# Patient Record
Sex: FEMALE | Race: WHITE | NOT HISPANIC OR LATINO | URBAN - NONMETROPOLITAN AREA
[De-identification: names, ages, dates, MRNs, and addresses within clinical notes are randomized per-mention and may not be internally consistent; named-entity substitution may affect disease eponyms.]

---

## 2021-04-15 ENCOUNTER — OFFICE VISIT (OUTPATIENT)
Dept: URBAN - NONMETROPOLITAN AREA CLINIC 2 | Facility: CLINIC | Age: 81
End: 2021-04-15
Payer: MEDICARE

## 2021-04-15 ENCOUNTER — LAB OUTSIDE AN ENCOUNTER (OUTPATIENT)
Dept: URBAN - NONMETROPOLITAN AREA CLINIC 2 | Facility: CLINIC | Age: 81
End: 2021-04-15

## 2021-04-15 ENCOUNTER — WEB ENCOUNTER (OUTPATIENT)
Dept: URBAN - NONMETROPOLITAN AREA CLINIC 2 | Facility: CLINIC | Age: 81
End: 2021-04-15

## 2021-04-15 VITALS
TEMPERATURE: 97.2 F | WEIGHT: 130 LBS | SYSTOLIC BLOOD PRESSURE: 108 MMHG | HEART RATE: 70 BPM | HEIGHT: 65 IN | DIASTOLIC BLOOD PRESSURE: 70 MMHG | BODY MASS INDEX: 21.66 KG/M2

## 2021-04-15 DIAGNOSIS — D18.03 LIVER HEMANGIOMA: ICD-10-CM

## 2021-04-15 DIAGNOSIS — Z12.11 ROUTINE COLON: ICD-10-CM

## 2021-04-15 PROCEDURE — 99214 OFFICE O/P EST MOD 30 MIN: CPT | Performed by: NURSE PRACTITIONER

## 2021-04-15 NOTE — HPI-TODAY'S VISIT:
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  Her last visit was in 2019 where she had multiple liver hemangiomas 1 almost 4 cm noted on ultrasound imaging.  She is on hormone replacement therapy persistently above the age of 79 and doing well on this, she is not interested in weaning off unless necessary.  Today she is here for repeat ultrasound imaging of her liver lesions.  Her last colonoscopy was in 2017 and normal.  Likely repeat as needed given age.  Today she has no new GI complaints.  MB

## 2022-04-12 ENCOUNTER — OFFICE VISIT (OUTPATIENT)
Dept: URBAN - NONMETROPOLITAN AREA CLINIC 2 | Facility: CLINIC | Age: 82
End: 2022-04-12
Payer: MEDICARE

## 2022-04-12 ENCOUNTER — LAB OUTSIDE AN ENCOUNTER (OUTPATIENT)
Dept: URBAN - NONMETROPOLITAN AREA CLINIC 2 | Facility: CLINIC | Age: 82
End: 2022-04-12

## 2022-04-12 DIAGNOSIS — Z12.11 ROUTINE COLON: ICD-10-CM

## 2022-04-12 DIAGNOSIS — D18.03 LIVER HEMANGIOMA: ICD-10-CM

## 2022-04-12 PROCEDURE — 99214 OFFICE O/P EST MOD 30 MIN: CPT | Performed by: INTERNAL MEDICINE

## 2022-04-12 NOTE — HPI-TODAY'S VISIT:
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  Her last visit was in 2019 where she had multiple liver hemangiomas 1 almost 4 cm noted on ultrasound imaging.  She is on hormone replacement therapy persistently above the age of 79 and doing well on this, she is not interested in weaning off unless necessary.  Today she is here for repeat ultrasound imaging of her liver lesions.  Her last colonoscopy was in 2017 and normal.  Likely repeat as needed given age.  Today she has no new GI complaints.  MB 4/12/2022 The patient presents today for follow-up of her liver cysts and hemangiomas.  Since her last visit, she has been doing quite well.  She denies any problems with her bowels.  She denies any blood in her stool.  She has not had a repeat ultrasound since her last visit.  She has had her blood work done with her PCP, and this was done last month.  Her last ultrasound was done in April 2021 with several small hemangiomas.  She does remain on hormone replacement therapy.  At this point, we have discussed repeating her ultrasound today, and she is in agreement with this plan.  Otherwise, she has no other GI symptoms today.

## 2022-04-20 ENCOUNTER — TELEPHONE ENCOUNTER (OUTPATIENT)
Dept: URBAN - METROPOLITAN AREA CLINIC 92 | Facility: CLINIC | Age: 82
End: 2022-04-20

## 2022-10-18 ENCOUNTER — OFFICE VISIT (OUTPATIENT)
Dept: URBAN - NONMETROPOLITAN AREA CLINIC 2 | Facility: CLINIC | Age: 82
End: 2022-10-18

## 2023-04-11 ENCOUNTER — OFFICE VISIT (OUTPATIENT)
Dept: URBAN - NONMETROPOLITAN AREA CLINIC 2 | Facility: CLINIC | Age: 83
End: 2023-04-11

## 2023-04-14 ENCOUNTER — OFFICE VISIT (OUTPATIENT)
Dept: URBAN - NONMETROPOLITAN AREA CLINIC 2 | Facility: CLINIC | Age: 83
End: 2023-04-14

## 2023-07-10 ENCOUNTER — OFFICE VISIT (OUTPATIENT)
Dept: URBAN - NONMETROPOLITAN AREA CLINIC 2 | Facility: CLINIC | Age: 83
End: 2023-07-10
Payer: MEDICARE

## 2023-07-10 ENCOUNTER — DASHBOARD ENCOUNTERS (OUTPATIENT)
Age: 83
End: 2023-07-10

## 2023-07-10 VITALS
BODY MASS INDEX: 22.66 KG/M2 | WEIGHT: 136 LBS | HEART RATE: 71 BPM | DIASTOLIC BLOOD PRESSURE: 66 MMHG | HEIGHT: 65 IN | TEMPERATURE: 97.8 F | SYSTOLIC BLOOD PRESSURE: 114 MMHG

## 2023-07-10 DIAGNOSIS — D18.03 LIVER HEMANGIOMA: ICD-10-CM

## 2023-07-10 PROBLEM — 93469006: Status: ACTIVE | Noted: 2021-04-15

## 2023-07-10 PROCEDURE — 99214 OFFICE O/P EST MOD 30 MIN: CPT | Performed by: NURSE PRACTITIONER

## 2023-07-10 RX ORDER — ASPIRIN 81 MG/1
EVERY 2 DAYS TABLET, COATED ORAL
Refills: 0 | Status: ACTIVE | COMMUNITY
Start: 2015-10-02

## 2023-07-10 NOTE — HPI-TODAY'S VISIT:
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  Her last visit was in 2019 where she had multiple liver hemangiomas 1 almost 4 cm noted on ultrasound imaging.  She is on hormone replacement therapy persistently above the age of 79 and doing well on this, she is not interested in weaning off unless necessary.  Today she is here for repeat ultrasound imaging of her liver lesions.  Her last colonoscopy was in 2017 and normal.  Likely repeat as needed given age.  Today she has no new GI complaints.  MB 4/12/2022 The patient presents today for follow-up of her liver cysts and hemangiomas.  Since her last visit, she has been doing quite well.  She denies any problems with her bowels.  She denies any blood in her stool.  She has not had a repeat ultrasound since her last visit.  She has had her blood work done with her PCP, and this was done last month.  Her last ultrasound was done in April 2021 with several small hemangiomas.  She does remain on hormone replacement therapy.  At this point, we have discussed repeating her ultrasound today, and she is in agreement with this plan.  Otherwise, she has no other GI symptoms today. 7/10/2023 Gurinder presents for follow-up of liver lesion.  Since her last visit she is been doing well.  She does remain on hormone replacement therapy.  Her last ultrasound in July 2022 was stable.  The cyst actually decreased to 2.5 cm.  Today she denies any new GI complaints, we will repeat her ultrasound in 1 year.  MB

## 2023-07-10 NOTE — PHYSICAL EXAM NEUROLOGIC:
Ecchymoses and pupura on UE bilaterally  · Advised mindfulness of movements  · Monitor  · continue APT   oriented to person, place and time ,  , cranial nerves 2-12 grossly intact

## 2024-07-08 ENCOUNTER — OFFICE VISIT (OUTPATIENT)
Dept: URBAN - NONMETROPOLITAN AREA CLINIC 2 | Facility: CLINIC | Age: 84
End: 2024-07-08
Payer: MEDICARE

## 2024-07-08 VITALS
HEART RATE: 69 BPM | SYSTOLIC BLOOD PRESSURE: 115 MMHG | BODY MASS INDEX: 22.49 KG/M2 | WEIGHT: 135 LBS | DIASTOLIC BLOOD PRESSURE: 73 MMHG | HEIGHT: 65 IN

## 2024-07-08 DIAGNOSIS — Z12.11 ROUTINE COLON: ICD-10-CM

## 2024-07-08 DIAGNOSIS — D18.03 LIVER HEMANGIOMA: ICD-10-CM

## 2024-07-08 PROCEDURE — 99214 OFFICE O/P EST MOD 30 MIN: CPT | Performed by: NURSE PRACTITIONER

## 2024-07-08 RX ORDER — ASPIRIN 81 MG/1
EVERY 2 DAYS TABLET, COATED ORAL
Refills: 0 | Status: ACTIVE | COMMUNITY
Start: 2015-10-02

## 2024-07-08 NOTE — HPI-TODAY'S VISIT:
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  Her last visit was in 2019 where she had multiple liver hemangiomas 1 almost 4 cm noted on ultrasound imaging.  She is on hormone replacement therapy persistently above the age of 79 and doing well on this, she is not interested in weaning off unless necessary.  Today she is here for repeat ultrasound imaging of her liver lesions.  Her last colonoscopy was in 2017 and normal.  Likely repeat as needed given age.  Today she has no new GI complaints.  MB 4/12/2022 The patient presents today for follow-up of her liver cysts and hemangiomas.  Since her last visit, she has been doing quite well.  She denies any problems with her bowels.  She denies any blood in her stool.  She has not had a repeat ultrasound since her last visit.  She has had her blood work done with her PCP, and this was done last month.  Her last ultrasound was done in April 2021 with several small hemangiomas.  She does remain on hormone replacement therapy.  At this point, we have discussed repeating her ultrasound today, and she is in agreement with this plan.  Otherwise, she has no other GI symptoms today. 7/10/2023 Gurinder presents for follow-up of liver lesion.  Since her last visit she is been doing well.  She does remain on hormone replacement therapy.  Her last ultrasound in July 2022 was stable.  The cyst actually decreased to 2.5 cm.  Today she denies any new GI complaints, we will repeat her ultrasound in 1 year.  MB 7/8/2024 [place] presents for follow-up.  Her repeat ultrasound shows stable liver lesion at 3 cm.  She has had no substantial change over to the years.  No further workup is needed at this time.  Today she denies any new GI complaints.  MB

## 2024-07-10 ENCOUNTER — LAB OUTSIDE AN ENCOUNTER (OUTPATIENT)
Dept: URBAN - NONMETROPOLITAN AREA CLINIC 2 | Facility: CLINIC | Age: 84
End: 2024-07-10